# Patient Record
Sex: MALE | Race: BLACK OR AFRICAN AMERICAN
[De-identification: names, ages, dates, MRNs, and addresses within clinical notes are randomized per-mention and may not be internally consistent; named-entity substitution may affect disease eponyms.]

---

## 2019-12-06 ENCOUNTER — HOSPITAL (OUTPATIENT)
Dept: OTHER | Age: 29
End: 2019-12-06

## 2019-12-06 PROCEDURE — 99283 EMERGENCY DEPT VISIT LOW MDM: CPT | Performed by: PHYSICIAN ASSISTANT

## 2024-03-27 ENCOUNTER — HOSPITAL ENCOUNTER (EMERGENCY)
Facility: CLINIC | Age: 34
Discharge: HOME | End: 2024-03-27
Payer: OTHER MISCELLANEOUS

## 2024-03-27 ENCOUNTER — APPOINTMENT (OUTPATIENT)
Dept: RADIOLOGY | Facility: CLINIC | Age: 34
End: 2024-03-27
Payer: OTHER MISCELLANEOUS

## 2024-03-27 VITALS
WEIGHT: 159 LBS | HEIGHT: 60 IN | DIASTOLIC BLOOD PRESSURE: 68 MMHG | SYSTOLIC BLOOD PRESSURE: 116 MMHG | OXYGEN SATURATION: 99 % | HEART RATE: 67 BPM | RESPIRATION RATE: 16 BRPM | BODY MASS INDEX: 31.22 KG/M2 | TEMPERATURE: 98.4 F

## 2024-03-27 DIAGNOSIS — M77.8 TENDONITIS OF WRIST, LEFT: ICD-10-CM

## 2024-03-27 DIAGNOSIS — M25.532 LEFT WRIST PAIN: ICD-10-CM

## 2024-03-27 PROCEDURE — 99283 EMERGENCY DEPT VISIT LOW MDM: CPT

## 2024-03-27 PROCEDURE — 73110 X-RAY EXAM OF WRIST: CPT | Mod: LT

## 2024-03-27 RX ORDER — MELOXICAM 15 MG/1
15 TABLET ORAL
Qty: 14 TABLET | Refills: 0 | Status: SHIPPED | OUTPATIENT
Start: 2024-03-27 | End: 2024-04-10

## 2024-03-27 ASSESSMENT — COLUMBIA-SUICIDE SEVERITY RATING SCALE - C-SSRS
2. HAVE YOU ACTUALLY HAD ANY THOUGHTS OF KILLING YOURSELF IN THE PAST MONTH?: NO
6. HAVE YOU EVER DONE ANYTHING, STARTED TO DO ANYTHING, OR PREPARED TO DO ANYTHING TO END YOUR LIFE?: NO
1. IN THE PAST MONTH, HAVE YOU WISHED YOU WERE DEAD OR WISHED YOU COULD GO TO SLEEP AND NOT WAKE UP?: NO

## 2024-03-27 NOTE — DISCHARGE INSTRUCTIONS
Today you were seen in the emergency department for left wrist pain. Thankfully, your evaluation and workup was reassuring and your symptoms do not appear to be due to a cause requiring emergent intervention at this time.     PAIN MANAGEMENT  Meloxicam - once daily with breakfast - DO NOT USE ADDITIONAL NSAIDS (ibuprofen or naproxen) WHILE TAKING THIS MEDICATION  Acetaminophen (Tylenol) - 500-1,000 MG every 6 hours    CARE AT HOME: the first 48-72 hours  Avoid activities that cause pain!  Follow the RICE protocol to help speed recovery:  Rest - Avoid putting weight on the painful joint/extremity and avoid activities that worsen the pain.  Ice - Use cold packs for 20 minutes at a time, every two to four hours, particularly after physical activity.   Compression - Lightly wrap the injured area in a soft bandage or ACE wrap until the inflammation dies down. Be sure not to compress the injured area too tightly.  Elevation - Elevate the injury higher than your heart while resting to reduce swelling  If we provided you with an immobilization brace, please use this to add stability to your injured joint/extremity. This brace is for comfort and does not need to be worn while bathing or sleeping unless you prefer.     FOLLOW UP  If symptoms do not improve within five days, please follow up with [facility] (contact information provided). Tell them you were seen in our department.    Call your doctor now or seek immediate medical care if:    You have signs of infection, such as:  Increased pain, swelling, warmth, and redness.  Red streaks leading from the joint.  A fever.   Watch closely for changes in your health, and be sure to contact your doctor if:    Your movement or symptoms are not getting better after 1 to 2 weeks of home treatment.

## 2024-03-27 NOTE — Clinical Note
Rajesh Mendez was seen and treated in our emergency department on 3/27/2024.  He may return to work on 04/04/2024.  WORK RESTRICTION - No lifting or pushing more than 5 lb with left wrist until 04/10/2024.     If you have any questions or concerns, please don't hesitate to call.      Ruth Pires, NIKOLAS

## 2024-03-27 NOTE — ED PROVIDER NOTES
Emergency Department Encounter  [facility] EMERGENCY ROOM  Rajesh Mendez   AGE: 33 year old SEX: male  YOB: 1990  MRN: [id number]      EVALUATION DATE & TIME: No admission date for patient encounter. 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  Initial swelling has improved but patient continues to have left wrist pain with movement.  No blunt trauma or falls.  No fever or chills.    Vitals reviewed and hemodynamically stable.  On exam, patient is well-appearing.  Right upper extremity and wrist unremarkable.  Patient has full active range of motion without joint laxity of the left wrist.  Left wrist is not hot or swollen.  There is tenderness to palpation of lateral left wrist along the extensor pollicis brevis muscle.  Positive Finkelstein test on the left.  Distal left upper extremity sensation and motor function intact.    Patient presentation is most consistent with tendinitis, most likely the left extensor pollicis brevis and abductor pollicis longus tendons consistent with De Quervain s. XR imaging of left wrist negative without evidence of fracture or dislocation.  Patient is otherwise well-appearing, without systemic symptoms, and without evidence of neurovascular injury or compartment syndrome.  I have low suspicion for significant ligamentous injury, septic arthritis, and gout flare.    Plan to discharge patient home with symptomatic care including recommendations of rest, ice, elevation, and daily meloxicam for 14 days.  Patient's left wrist was wrapped with an ACE wrap for compression prior to leaving the emergency department.  I provided patient a work note  with a restriction not to lift more than 5 pounds with the left wrist for the next 7 days.  Contact information for orthopedics was provided if this conservative management does not improve symptoms. Patient was provided with clear, written instructions of potential danger signs and where and when to return for emergency care or re-evaluation.      Medical Decision Making  Obtained supplemental history:Supplemental history obtained?: No  Reviewed external records: External records reviewed?: No  Care impacted by chronic illness:N/A  Care significantly affected by social determinants of health:Literacy  Did you consider but not order tests?: Work up considered but not performed and documented in chart, if applicable  Did you interpret images independently?: Independent interpretation of ECG and images noted in documentation, when applicable.  Consultation discussion with other provider:Did you involve another provider (consultant, , pharmacy, etc.)?: No  Discharge. I prescribed additional prescription strength medication(s) as charted. See documentation for any additional details.    FINAL IMPRESSION       ICD-10-CM    1. Left wrist pain  M25.532       2. Tendonitis of wrist, left  M77.8           ED COURSE   1:15 PM I met and introduced myself to the patient. I gathered initial history and performed my physical exam. We discussed plan for initial workup.   1:46 PM I rechecked the patient and discussed results, discharge, follow up, and reasons to return to the ED.     MEDICATIONS GIVEN IN THE EMERGENCY DEPARTMENT:   Medications - No data to display   NEW PRESCRIPTIONS STARTED AT TODAY'S ED VISIT:  Discharge Medication List as of 3/27/2024  1:54 PM        START taking these medications    Details   meloxicam (MOBIC) 15 MG tablet Take 1 tablet (15 mg) by mouth daily (with breakfast) for 14 days, Disp-14 tablet, R-0, E-Prescribe                 =================================================================          HISTORY OF PRESENT ILLNESS   Patient information was obtained from: patient   Use of Intrepreter: N/A     Rajesh Mendez is an otherwise healthy 33 year old male who presents to the ED by private vehicle for evaluation of left wrist pain.  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  The swelling and pain in his right wrist has resolved at this point but the left wrist continues to cause him pain.  He denies any blunt trauma or falls.  No fevers or chills.    MEDICAL HISTORY     No past medical history on file.    No past surgical history on file.    No family history on file.         meloxicam (MOBIC) 15 MG tablet        PHYSICAL EXAM   VITALS:  Patient Vitals for the past 24 hrs:   BP Temp Temp src Pulse Resp SpO2 Height Weight   03/27/24 1352 116/68 -- -- 67 16 99 % -- --   03/27/24 1250 136/70 98.4  F (36.9  C) Temporal 79 16 97 % 1.524 m (5') 72.1 kg (159 lb)     CONSITUTIONAL: Generally well-appearing male , in no acute distress. Well developed, nourished.  EYES: Conjunctivae clear, no discharge. EOM grossly intact.  HENT: Normocephalic, atraumatic, mucous membranes moist, nose normal.  NECK: Supple, gross ROM intact.   RESPIRATORY: Normal work of breathing, normal rate, speaks in full sentences.  CARDIO: Normal heart rate.  GI: Nondistended.   MSK: Moving all 4 extremities intentionally and without pain.  There is tenderness to palpation of left wrist along the extensor pollicis brevis muscle.  Positive Finkelstein on left.  No swelling, erythema, or bony tenderness to wrists bilaterally.  No snuffbox tenderness bilaterally.  NEURO:  AxOx4. CN II-XII grossly intact, but not individually tested. No focal neurological deficits.   PSYCH: Thoughts linear and responses appropriate. Cooperative. Appropriate mood and affect.     LABS AND IMAGING   All pertinent labs reviewed and  interpreted  Labs Ordered and Resulted from Time of ED Arrival to Time of ED Departure - No data to display    XR Wrist Left G/E 3 Views   Final Result   IMPRESSION: Normal joint spaces and alignment. No fracture.        Ruth Pires PA-C   Emergency Medicine   [facility] EMERGENCY ROOM  [address] [facility] [id number] [phone]  Dept: [phone]      Ruth Pires PA-C  03/27/24 1954

## 2024-03-27 NOTE — ED TRIAGE NOTES
Pt presents to the ED for on-going thumb/wrist discomfort in both wrists related to the repetitive work of his job. Reports improvement in right hand with exercises and massage but no improvement in left hand. Endorses difficulty twisting objects and weaker      Triage Assessment (Adult)       Row Name 03/27/24 1248          Triage Assessment    Airway WDL WDL        Respiratory WDL    Respiratory WDL WDL        Skin Circulation/Temperature WDL    Skin Circulation/Temperature WDL WDL        Cardiac WDL    Cardiac WDL WDL        Peripheral/Neurovascular WDL    Peripheral Neurovascular WDL WDL        Cognitive/Neuro/Behavioral WDL    Cognitive/Neuro/Behavioral WDL WDL